# Patient Record
Sex: FEMALE | Race: BLACK OR AFRICAN AMERICAN | Employment: FULL TIME | URBAN - METROPOLITAN AREA
[De-identification: names, ages, dates, MRNs, and addresses within clinical notes are randomized per-mention and may not be internally consistent; named-entity substitution may affect disease eponyms.]

---

## 2021-02-02 ENCOUNTER — VIRTUAL VISIT (OUTPATIENT)
Dept: NEUROLOGY | Age: 36
End: 2021-02-02
Payer: COMMERCIAL

## 2021-02-02 DIAGNOSIS — G51.4 FACIAL TWITCHING: ICD-10-CM

## 2021-02-02 DIAGNOSIS — R20.0 FACIAL NUMBNESS: Primary | ICD-10-CM

## 2021-02-02 PROCEDURE — 99204 OFFICE O/P NEW MOD 45 MIN: CPT | Performed by: PSYCHIATRY & NEUROLOGY

## 2021-02-02 NOTE — PROGRESS NOTES
Neurology Note    Patient ID:  Scarlet Landry  [id number]  30 y.o.  1985      Date of Consultation:  February 2, 2021    Referring Physician: Sara Beard NP     Reason for Consultation:  Left face numbness and tingling     This is a telemedicine visit that was performed with in the originating site at patient's home and the distance site at [facility] outpatient clinic at [facility].  This telemedicine visit utilized synchronous (real-time) audio-video technology. Verbal consent to participate in the video visit was obtained. This visit occurred during the corona (COVID -19) public health emergency. I discussed with the patient the nature of our telemedicine visit, that:  - I would evaluate the patient and recommend diagnostic and treatment based on my assessment  - Our sessions are not being recorded and that personal health information is protected  - Our team will provide follow-up care in person if and when the patient needs it. Consent:  The patient is aware that this patient-initiated Telehealth encounter is a billable service, with coverage as determined by the patient's insurance carrier. The patient is aware that they may receive a bill and has provided verbal consent to proceed:     Subjective:       History of Present Illness:   Scarlet Landry is a 30 y.o. female with no prior medical history who presents to neurology clinic for evaluation of facial numbness and twitching that has been ongoing for a few years. Patient reports that she initially started having facial twitching mainly in the eyelids and on the left side of her face that started a few years ago. She was previously seen by a neurologist who did not find any etiology of her symptoms. She did not have any imaging done at that time. She reports that this is persisted and she has twitching in her face specifically the left cheek as well as the eyes and eyelids that occurs daily.   She reports that this occurs for a few seconds and then resolves and can return multiple times a day. She does not have any pain or discomfort with the symptoms however feels that it is an annoyance. Additionally for the last 1 month she has noted that she has developed numbness across her forehead and down her nose. She reports that this has occurred twice since developing the symptoms and it has lasted for a few days and then resolved completely. She states that the sensation is still the same however it just feels numb. She does not have any tingling associated with this. Patient reports that she sleeps well and she stays hydrated and eats a well-balanced diet. She denies any prior symptoms of weakness, numbness, speech changes or vision changes. Denies any family history of MS however does believe that her mother was diagnosed with muscular dystrophy. Medical hx:   No prior hx     Surgical hx   None     Family History   Problem Relation Age of Onset    Migraines Mother    [address] Migraines Brother       Mother: muscular dystrophy and cerebral palsy     Social History     Tobacco Use    Smoking status: Never Smoker    Smokeless tobacco: Never Used   Substance Use Topics    Alcohol use: Yes        Not on File     Prior to Admission medications    Not on File       Review of Systems:    General, constitutional: negative  Eyes, vision: negative  Ears, nose, throat: negative  Cardiovascular, heart: negative  Respiratory: negative  Gastrointestinal: negative  Genitourinary: negative  Musculoskeletal: negative  Skin and integumentary: negative  Psychiatric: negative  Endocrine: negative  Neurological: negative, except for HPI  Hematologic/lymphatic: negative  Allergy/immunology: negative    Objective:     No vital signs were obtained via telemedicine today.      There are limitations to the neurological examination due to the technological features of telemedicine     Physical Exam:  General:  appears well nourished in no acute distress  Respiratory:  good respiratory effort. No labored breathing  Skin: intact. No obvious erythematous rashes     Neurological exam:  Awake, alert, oriented to person, place and time  Attention and concentration were intact  Language was intact. There was no aphasia  Speech: no dysarthria  Fund of knowledge was preserved     Cranial nerves:   Visual fields were full  Eomi, no evidence of nystagmus  Facial motor: normal and symmetric  Hearing intact  Tongue: midline without fasciculations     Motor:   Appears full strength in the upper and lower extremities. No drift was noted     Sensory:  Intact per patient     Reflexes:  Unable to obtain via telemedicine     Cerebellar testing:  no tremor apparent, finger/nose and jens were intact     Romberg: absent     Gait: steady. Labs:     No labs available for review     Imaging:   No imaging has been done. Assessment and plan  Snehal Patrick is a 30 y.o. female with no prior medical history who presents to neurology clinic for evaluation of facial numbness and twitching of unclear etiology. Her neurological exam does appear to be unremarkable however the description of the numbness does appear to follow the V1 distribution. No facial drawstring was observed during my evaluation.  -We will obtain basic laboratory studies to ensure that all her electrolytes are within normal.  -We will additionally check thyroid and vitamin D level to ensure that these are within normal limits as well. -Given the facial numbness which follows the V1 distribution, will obtain an MRI of the brain to ensure that she does not have any evidence of demyelinating disease.  -Based on the results of this we may need to do additional imaging with an EMG or an MRI of the cervical spine to further determine etiology.  -I did recommend that patient attempt to stay hydrated as much as possible and eat a well-balanced diet.   The symptoms may be related to electrolyte abnormalities as well.    Return to clinic in 3 months    Signed By:  Isaias Meredith MD     February 2, 2021

## 2021-02-02 NOTE — PATIENT INSTRUCTIONS
Eyelid Twitch: Care Instructions Your Care Instructions An eyelid twitch is a muscle spasm in your eyelid that you cannot control. Sometimes the eyelid closes or nearly closes and then opens again. You may have problems with one or both eyes. You may also be sensitive to bright light. Your eyelid muscles may twitch because you are tired or stressed. Drinking beverages with caffeine can also cause eyelid twitches. These twitches may bother you off and on for several days. This type of eyelid twitch is common and can be very annoying. Often, the eyelid twitch goes away while you sleep and starts again when you are awake. Most people do not even notice when the twitch stops. Your doctor may not be able to find a cause for your eyelid twitching. If your eyelid twitching is severe, you may consider getting botulinum toxin (Botox) injections. Follow-up care is a key part of your treatment and safety. Be sure to make and go to all appointments, and call your doctor if you are having problems. It's also a good idea to know your test results and keep a list of the medicines you take. How can you care for yourself at home? · Get more sleep, which can help relieve eyelid twitches. · Drink less caffeine. It can cause muscle spasms in your eyes. · Use eyedrops to keep your eyes moist. 
When should you call for help? Watch closely for changes in your health, and be sure to contact your doctor if: 
  · The twitching in your eyelid lasts longer than 1 week.  
  · You begin to have twitches in other parts of your face.  
  · You have redness or swelling of your eye.  
  · You have fluid leaking from your eye.  
  · Your eyelid closes completely.  
  · You do not get better as expected. Where can you learn more? Go to http://bee-elio.info/ Enter P214 in the search box to learn more about \"Eyelid Twitch: Care Instructions. \" Current as of: June 26, 2019               Content Version: 12.6 © 7010-4287 [employer]. Care instructions adapted under license by [facility] (which disclaims liability or warranty for this information). If you have questions about a medical condition or this instruction, always ask your healthcare professional. [address] any warranty or liability for your use of this information.

## 2021-05-03 ENCOUNTER — TELEPHONE (OUTPATIENT)
Dept: NEUROLOGY | Age: 36
End: 2021-05-03

## 2021-05-03 NOTE — TELEPHONE ENCOUNTER
----- Message from Hitesh Caldwell sent at 5/3/2021  1:07 PM EDT -----  Regarding: Dr. Whitaker Dawley: [phone]  General Message/Vendor Calls    Caller's first and last name:Pt      Reason for call:Pt had to reschedule her MRI and would like a call back to see if she should still attend her 5/5 follow up appt with Dr. Miquel Lemus required yes/no and why:Yes, confirm.        Best contact number(s):[phone]      Details to clarify the request:n/a      Hitesh Caldwell

## 2021-05-15 ENCOUNTER — HOSPITAL ENCOUNTER (OUTPATIENT)
Dept: MRI IMAGING | Age: 36
Discharge: HOME | End: 2021-05-15
Attending: PSYCHIATRY & NEUROLOGY
Payer: COMMERCIAL

## 2021-05-15 DIAGNOSIS — R20.0 FACIAL NUMBNESS: ICD-10-CM

## 2021-05-15 DIAGNOSIS — G51.4 FACIAL TWITCHING: ICD-10-CM

## 2021-05-15 PROCEDURE — 70553 MRI BRAIN STEM W/O & W/DYE: CPT

## 2021-05-15 PROCEDURE — A9576 INJ PROHANCE MULTIPACK: HCPCS | Performed by: PSYCHIATRY & NEUROLOGY

## 2021-05-15 PROCEDURE — 74011636320 HC RX REV CODE- 636/320: Performed by: PSYCHIATRY & NEUROLOGY

## 2021-05-15 RX ADMIN — GADOTERIDOL 15 ML: 279.3 INJECTION, SOLUTION INTRAVENOUS at 09:04

## 2021-05-18 NOTE — PROGRESS NOTES
Please let patietn know that her MRI of the brain is normal. It did not show evidence of a stroke, mass or bleed. It did not show evidence of an autoimmune disease either.

## 2021-05-19 ENCOUNTER — TELEPHONE (OUTPATIENT)
Dept: NEUROLOGY | Age: 36
End: 2021-05-19

## 2021-05-19 NOTE — TELEPHONE ENCOUNTER
----- Message from Stephen Carrillo sent at 5/19/2021 11:28 AM EDT -----  Regarding: /telephone  General Message/Vendor Calls    Caller's first and last name:      Reason for call: The pt is returning Usha's call. Callback required yes/no and why:Yes.       Best contact number(s):[phone]

## 2021-05-19 NOTE — TELEPHONE ENCOUNTER
----- Message from Minh Marshall MD sent at 5/18/2021  4:35 PM EDT -----  Please let patietn know that her MRI of the brain is normal. It did not show evidence of a stroke, mass or bleed. It did not show evidence of an autoimmune disease either.

## 2021-06-02 ENCOUNTER — PATIENT MESSAGE (OUTPATIENT)
Dept: NEUROLOGY | Age: 36
End: 2021-06-02

## 2021-06-02 DIAGNOSIS — R20.0 FACIAL NUMBNESS: Primary | ICD-10-CM

## 2021-06-02 DIAGNOSIS — G51.4 FACIAL TWITCHING: ICD-10-CM

## 2021-06-02 NOTE — TELEPHONE ENCOUNTER
From: Elvi Espinal  To: Maurice Maradiaga MD  Sent: 6/2/2021 1:57 PM EDT  Subject: Test Results Question    Good afternoon,     My MRI test results indicated mild sphenoid sinus disease. Could this be the cause of my frequent headaches and facial issues? If so, is there an ENT doctor that I can be referred to if it requires treatment?     Thanks,   Karol Sparrow